# Patient Record
Sex: FEMALE | URBAN - METROPOLITAN AREA
[De-identification: names, ages, dates, MRNs, and addresses within clinical notes are randomized per-mention and may not be internally consistent; named-entity substitution may affect disease eponyms.]

---

## 2022-11-23 ENCOUNTER — TELEPHONE (OUTPATIENT)
Dept: PULMONOLOGY | Age: 57
End: 2022-11-23

## 2022-11-28 NOTE — TELEPHONE ENCOUNTER
Pt called to check status of referral, advised pt that office is waiting for imaging discs for Dr. Andree Ruano to review, then we will contact her about making an appt. Pt verbalized understanding.

## 2022-11-29 NOTE — TELEPHONE ENCOUNTER
CT lung screen rec'd from [address] [place] [address] from [facility] oncology called to check on status of imaging before I reached out to [facility] radiology and she indicated that [facility] radiology didn't have my request for PET scan. Faxed the request to [facility] radiology again for PET scan. Will watch for disc to be sent overnight.

## 2022-12-01 NOTE — TELEPHONE ENCOUNTER
Pt LM on VM asking for a return call. I called patient back whom states that [facility] is not in network with pt insurance (she had [employer]) she would be responsible for at least 50% of it. States our providers are in network with her insurance but the hospital is not. I did give her both the office NPI/tax ID as well as the hospital just to double check with insurance. Patient will call Dr Kinjal Coronel office tomorrow when she's back in to see which route she prefers her to go.  Will follow up with patient again tomorrow 12/2/22

## 2022-12-02 NOTE — TELEPHONE ENCOUNTER
Spoke with pt whom states that she has not heard back from Dr. Hank Mistry office yet.   Will follow up with pt Monday per pt request.

## 2022-12-06 NOTE — TELEPHONE ENCOUNTER
Called Dr Claudio Nolasco office spoke with Michelle Houser states they are aware Dr Bobby Babcock is referring patient to someone in her network.   Can disregard referral.